# Patient Record
Sex: MALE | Race: OTHER | HISPANIC OR LATINO | URBAN - METROPOLITAN AREA
[De-identification: names, ages, dates, MRNs, and addresses within clinical notes are randomized per-mention and may not be internally consistent; named-entity substitution may affect disease eponyms.]

---

## 2020-04-09 ENCOUNTER — EMERGENCY (EMERGENCY)
Facility: HOSPITAL | Age: 60
LOS: 0 days | Discharge: HOME | End: 2020-04-09
Payer: MEDICARE

## 2020-04-09 VITALS
RESPIRATION RATE: 18 BRPM | TEMPERATURE: 100 F | SYSTOLIC BLOOD PRESSURE: 124 MMHG | DIASTOLIC BLOOD PRESSURE: 83 MMHG | OXYGEN SATURATION: 94 % | HEART RATE: 105 BPM

## 2020-04-09 DIAGNOSIS — U07.1 COVID-19: ICD-10-CM

## 2020-04-09 DIAGNOSIS — R50.9 FEVER, UNSPECIFIED: ICD-10-CM

## 2020-04-09 DIAGNOSIS — R05 COUGH: ICD-10-CM

## 2020-04-09 DIAGNOSIS — Z20.828 CONTACT WITH AND (SUSPECTED) EXPOSURE TO OTHER VIRAL COMMUNICABLE DISEASES: ICD-10-CM

## 2020-04-09 PROCEDURE — 99283 EMERGENCY DEPT VISIT LOW MDM: CPT

## 2020-04-09 PROCEDURE — 87635 SARS-COV-2 COVID-19 AMP PRB: CPT

## 2020-04-09 NOTE — ED STATDOCS - PATIENT PORTAL LINK FT
You can access the FollowMyHealth Patient Portal offered by [facility] by registering at the following website: [email or web address]. By joining [facility]’s FollowMyHealth portal, you will also be able to view your health information using other applications (apps) compatible with our system.

## 2020-04-09 NOTE — ED STATDOCS - OBJECTIVE STATEMENT
Pt with no PMH presents to ED with subjective fever, cough, body aches, x 7 days. Pt recently exposed to COVID-19. Pt here for testing.

## 2020-04-09 NOTE — ED ADULT NURSE NOTE - OBJECTIVE STATEMENT
PATIENT WAS TREATED, EVALUATED AND DISCHARGED BY INTAKE PROVIDER. PLEASE SEE PROVIDER NOTE FOR ASSESSMENT.  DISCHARGE INSTRUCTIONS REVIEWED WITH PATIENT VERBALLY, PT VERBALIZED UNDERSTANDING OF DISCHARGE INSTRUCTIONS. PAPER COPY OF DISCHARGE INSTRUCTIONS GIVEN TO PATIENT WITH SELF KRISTEN AND COVID 19 INFORMATION.  Patient has given verbal consent to call/text them with results.

## 2020-04-10 LAB — SARS-COV-2 RNA SPEC QL NAA+PROBE: DETECTED

## 2020-04-12 ENCOUNTER — EMERGENCY (EMERGENCY)
Facility: HOSPITAL | Age: 60
LOS: 0 days | Discharge: HOME | End: 2020-04-12
Attending: EMERGENCY MEDICINE
Payer: MEDICARE

## 2020-04-12 VITALS — HEIGHT: 70 IN | WEIGHT: 130.07 LBS

## 2020-04-12 VITALS
DIASTOLIC BLOOD PRESSURE: 81 MMHG | RESPIRATION RATE: 20 BRPM | OXYGEN SATURATION: 95 % | SYSTOLIC BLOOD PRESSURE: 145 MMHG | HEART RATE: 110 BPM

## 2020-04-12 DIAGNOSIS — U07.1 COVID-19: ICD-10-CM

## 2020-04-12 DIAGNOSIS — R19.7 DIARRHEA, UNSPECIFIED: ICD-10-CM

## 2020-04-12 DIAGNOSIS — R06.02 SHORTNESS OF BREATH: ICD-10-CM

## 2020-04-12 DIAGNOSIS — R11.10 VOMITING, UNSPECIFIED: ICD-10-CM

## 2020-04-12 PROCEDURE — 99283 EMERGENCY DEPT VISIT LOW MDM: CPT

## 2020-04-12 PROCEDURE — 99053 MED SERV 10PM-8AM 24 HR FAC: CPT

## 2020-04-12 PROCEDURE — 94640 AIRWAY INHALATION TREATMENT: CPT

## 2020-04-12 RX ORDER — ACETAMINOPHEN 500 MG
650 TABLET ORAL ONCE
Refills: 0 | Status: COMPLETED | OUTPATIENT
Start: 2020-04-12 | End: 2020-04-12

## 2020-04-12 RX ORDER — ALBUTEROL 90 UG/1
2 AEROSOL, METERED ORAL ONCE
Refills: 0 | Status: COMPLETED | OUTPATIENT
Start: 2020-04-12 | End: 2020-04-12

## 2020-04-12 RX ADMIN — Medication 650 MILLIGRAM(S): at 04:02

## 2020-04-12 RX ADMIN — ALBUTEROL 2 PUFF(S): 90 AEROSOL, METERED ORAL at 04:02

## 2020-04-12 NOTE — ED PROVIDER NOTE - PATIENT PORTAL LINK FT
You can access the FollowMyHealth Patient Portal offered by [facility] by registering at the following website: [email or web address]. By joining [employer]’s FollowMyHealth portal, you will also be able to view your health information using other applications (apps) compatible with our system.

## 2020-04-12 NOTE — ED PROVIDER NOTE - CLINICAL SUMMARY MEDICAL DECISION MAKING FREE TEXT BOX
Satting well on RA, no respiratory distress.  Plan for d/c home with tylenol. albuterol.  Return precautions given.

## 2020-04-12 NOTE — ED PROVIDER NOTE - OBJECTIVE STATEMENT
58 yo M no significant PMHx presents with CC of SOB.  Positive COVID.  Symptoms x 8 days including fever, SOB, cough, vomiting, diarrhea.  States felt SOB tonight, and came to ED for further evaluation.  Has been taking tylenol for fever.

## 2020-04-12 NOTE — ED ADULT TRIAGE NOTE - CHIEF COMPLAINT QUOTE
COVID+ on 4/9. Complaining of 8 days of fever, unknown how high, cough, abdominal pain, vomiting and diarrhea, and new onset of trouble breathing which brought him to ED tonight. Patient has dry cough at triage, breathing even and non labored.

## 2020-04-12 NOTE — ED ADULT NURSE NOTE - NSIMPLEMENTINTERV_GEN_ALL_ED
Implemented All Universal Safety Interventions:  [place] to call system. Call bell, personal items and telephone within reach. Instruct patient to call for assistance. Room bathroom lighting operational. Non-slip footwear when patient is off stretcher. Physically safe environment: no spills, clutter or unnecessary equipment. Stretcher in lowest position, wheels locked, appropriate side rails in place.